# Patient Record
Sex: FEMALE | Race: WHITE | NOT HISPANIC OR LATINO | Employment: OTHER | URBAN - METROPOLITAN AREA
[De-identification: names, ages, dates, MRNs, and addresses within clinical notes are randomized per-mention and may not be internally consistent; named-entity substitution may affect disease eponyms.]

---

## 2017-06-06 ENCOUNTER — HOSPITAL ENCOUNTER (EMERGENCY)
Facility: HOSPITAL | Age: 47
Discharge: HOME | End: 2017-06-07
Attending: EMERGENCY MEDICINE | Admitting: EMERGENCY MEDICINE

## 2017-06-06 DIAGNOSIS — IMO0001 WOUND INFECTION AFTER SURGERY, INITIAL ENCOUNTER: Primary | ICD-10-CM

## 2017-06-06 LAB
ALBUMIN SERPL-MCNC: 4.5 G/DL (ref 3.2–4.8)
ALBUMIN/GLOB SERPL: 1.5 G/DL (ref 1.5–2.5)
ALP SERPL-CCNC: 129 U/L (ref 25–100)
ALT SERPL W P-5'-P-CCNC: 22 U/L (ref 7–40)
ANION GAP SERPL CALCULATED.3IONS-SCNC: 0 MMOL/L (ref 3–11)
AST SERPL-CCNC: 24 U/L (ref 0–33)
BASOPHILS # BLD AUTO: 0.06 10*3/MM3 (ref 0–0.2)
BASOPHILS NFR BLD AUTO: 0.4 % (ref 0–1)
BILIRUB SERPL-MCNC: 0.2 MG/DL (ref 0.3–1.2)
BUN BLD-MCNC: 16 MG/DL (ref 9–23)
BUN/CREAT SERPL: 20 (ref 7–25)
CALCIUM SPEC-SCNC: 9.7 MG/DL (ref 8.7–10.4)
CHLORIDE SERPL-SCNC: 106 MMOL/L (ref 99–109)
CO2 SERPL-SCNC: 33 MMOL/L (ref 20–31)
CREAT BLD-MCNC: 0.8 MG/DL (ref 0.6–1.3)
DEPRECATED RDW RBC AUTO: 53.7 FL (ref 37–54)
EOSINOPHIL # BLD AUTO: 0.26 10*3/MM3 (ref 0.1–0.3)
EOSINOPHIL NFR BLD AUTO: 1.6 % (ref 0–3)
ERYTHROCYTE [DISTWIDTH] IN BLOOD BY AUTOMATED COUNT: 15.1 % (ref 11.3–14.5)
GFR SERPL CREATININE-BSD FRML MDRD: 77 ML/MIN/1.73
GLOBULIN UR ELPH-MCNC: 3 GM/DL
GLUCOSE BLD-MCNC: 82 MG/DL (ref 70–100)
HCT VFR BLD AUTO: 47.9 % (ref 34.5–44)
HGB BLD-MCNC: 15.1 G/DL (ref 11.5–15.5)
IMM GRANULOCYTES # BLD: 0.05 10*3/MM3 (ref 0–0.03)
IMM GRANULOCYTES NFR BLD: 0.3 % (ref 0–0.6)
LYMPHOCYTES # BLD AUTO: 2.82 10*3/MM3 (ref 0.6–4.8)
LYMPHOCYTES NFR BLD AUTO: 17.1 % (ref 24–44)
MCH RBC QN AUTO: 30.4 PG (ref 27–31)
MCHC RBC AUTO-ENTMCNC: 31.5 G/DL (ref 32–36)
MCV RBC AUTO: 96.4 FL (ref 80–99)
MONOCYTES # BLD AUTO: 1.02 10*3/MM3 (ref 0–1)
MONOCYTES NFR BLD AUTO: 6.2 % (ref 0–12)
NEUTROPHILS # BLD AUTO: 12.26 10*3/MM3 (ref 1.5–8.3)
NEUTROPHILS NFR BLD AUTO: 74.4 % (ref 41–71)
PLATELET # BLD AUTO: 187 10*3/MM3 (ref 150–450)
PMV BLD AUTO: 10.7 FL (ref 6–12)
POTASSIUM BLD-SCNC: 4.1 MMOL/L (ref 3.5–5.5)
PROT SERPL-MCNC: 7.5 G/DL (ref 5.7–8.2)
RBC # BLD AUTO: 4.97 10*6/MM3 (ref 3.89–5.14)
SODIUM BLD-SCNC: 139 MMOL/L (ref 132–146)
WBC NRBC COR # BLD: 16.47 10*3/MM3 (ref 3.5–10.8)

## 2017-06-06 PROCEDURE — 80053 COMPREHEN METABOLIC PANEL: CPT | Performed by: EMERGENCY MEDICINE

## 2017-06-06 PROCEDURE — 87205 SMEAR GRAM STAIN: CPT | Performed by: EMERGENCY MEDICINE

## 2017-06-06 PROCEDURE — 96367 TX/PROPH/DG ADDL SEQ IV INF: CPT

## 2017-06-06 PROCEDURE — 99284 EMERGENCY DEPT VISIT MOD MDM: CPT

## 2017-06-06 PROCEDURE — 87186 SC STD MICRODIL/AGAR DIL: CPT | Performed by: EMERGENCY MEDICINE

## 2017-06-06 PROCEDURE — 25010000002 VANCOMYCIN HCL IN NACL 2-0.9 GM/500ML-% SOLUTION: Performed by: EMERGENCY MEDICINE

## 2017-06-06 PROCEDURE — 96366 THER/PROPH/DIAG IV INF ADDON: CPT

## 2017-06-06 PROCEDURE — 87070 CULTURE OTHR SPECIMN AEROBIC: CPT | Performed by: EMERGENCY MEDICINE

## 2017-06-06 PROCEDURE — 87077 CULTURE AEROBIC IDENTIFY: CPT | Performed by: EMERGENCY MEDICINE

## 2017-06-06 PROCEDURE — 96375 TX/PRO/DX INJ NEW DRUG ADDON: CPT

## 2017-06-06 PROCEDURE — 87147 CULTURE TYPE IMMUNOLOGIC: CPT | Performed by: EMERGENCY MEDICINE

## 2017-06-06 PROCEDURE — 96365 THER/PROPH/DIAG IV INF INIT: CPT

## 2017-06-06 PROCEDURE — 85025 COMPLETE CBC W/AUTO DIFF WBC: CPT | Performed by: EMERGENCY MEDICINE

## 2017-06-06 RX ORDER — HYDROCODONE BITARTRATE AND ACETAMINOPHEN 7.5; 325 MG/1; MG/1
1 TABLET ORAL ONCE
Status: COMPLETED | OUTPATIENT
Start: 2017-06-06 | End: 2017-06-06

## 2017-06-06 RX ORDER — ATORVASTATIN CALCIUM 40 MG/1
40 TABLET, FILM COATED ORAL DAILY
COMMUNITY

## 2017-06-06 RX ORDER — ACETAMINOPHEN 500 MG
1000 TABLET ORAL ONCE
Status: COMPLETED | OUTPATIENT
Start: 2017-06-06 | End: 2017-06-06

## 2017-06-06 RX ORDER — PANTOPRAZOLE SODIUM 40 MG/1
40 TABLET, DELAYED RELEASE ORAL DAILY
COMMUNITY

## 2017-06-06 RX ORDER — ALBUTEROL SULFATE 90 UG/1
2 AEROSOL, METERED RESPIRATORY (INHALATION) EVERY 4 HOURS PRN
COMMUNITY

## 2017-06-06 RX ORDER — HYDROCODONE BITARTRATE AND ACETAMINOPHEN 5; 325 MG/1; MG/1
1 TABLET ORAL ONCE
Status: COMPLETED | OUTPATIENT
Start: 2017-06-06 | End: 2017-06-06

## 2017-06-06 RX ORDER — LISINOPRIL 10 MG/1
10 TABLET ORAL DAILY
COMMUNITY

## 2017-06-06 RX ORDER — CARVEDILOL 6.25 MG/1
6.25 TABLET ORAL 2 TIMES DAILY WITH MEALS
COMMUNITY

## 2017-06-06 RX ORDER — VANCOMYCIN 2 GRAM/500 ML IN 0.9 % SODIUM CHLORIDE INTRAVENOUS
2000 ONCE
Status: COMPLETED | OUTPATIENT
Start: 2017-06-06 | End: 2017-06-07

## 2017-06-06 RX ORDER — CEPHALEXIN 250 MG/1
500 CAPSULE ORAL ONCE
Status: COMPLETED | OUTPATIENT
Start: 2017-06-06 | End: 2017-06-06

## 2017-06-06 RX ORDER — ASPIRIN 81 MG/1
81 TABLET ORAL DAILY
COMMUNITY

## 2017-06-06 RX ORDER — SULFAMETHOXAZOLE AND TRIMETHOPRIM 800; 160 MG/1; MG/1
1 TABLET ORAL ONCE
Status: COMPLETED | OUTPATIENT
Start: 2017-06-06 | End: 2017-06-06

## 2017-06-06 RX ADMIN — HYDROCODONE BITARTRATE AND ACETAMINOPHEN 1 TABLET: 5; 325 TABLET ORAL at 19:49

## 2017-06-06 RX ADMIN — SULFAMETHOXAZOLE AND TRIMETHOPRIM 160 MG: 800; 160 TABLET ORAL at 19:49

## 2017-06-06 RX ADMIN — CEPHALEXIN 500 MG: 250 CAPSULE ORAL at 19:49

## 2017-06-06 RX ADMIN — ACETAMINOPHEN 1000 MG: 500 TABLET ORAL at 20:45

## 2017-06-06 RX ADMIN — HYDROCODONE BITARTRATE AND ACETAMINOPHEN 1 TABLET: 7.5; 325 TABLET ORAL at 17:39

## 2017-06-06 RX ADMIN — Medication 2000 MG: at 22:44

## 2017-06-06 NOTE — ED PROVIDER NOTES
Subjective   HPI Comments: Tonia Craven is a 47 y.o.female who presents to the ED with c/o post-op problem. The patient reports she had lypomas removed on her upper back and left breast 2 weeks ago, and has experienced gradually worsening swelling and pain at the site on her back since the surgery. She was seen at [facility] ED earlier today, who suggested she may have a hematoma or small cysts, and suggested follow up with her surgeon. She has experienced continued pain and erythema at the surgical site on her back, and her surgeon is currently out of town, prompting presentation into the ED for further evaluation. The patient denies fever, abdominal pain, or any other acute complaints at this time.      Patient is a 47 y.o. female presenting with wound check.   History provided by:  Patient  Wound Check   Location:  Pt had lypoma removed on her upper back 2 weeks ago  Severity:  Moderate  Onset quality:  Sudden  Duration:  2 weeks  Timing:  Constant  Progression:  Worsening  Chronicity:  New  Context:  Pt has experienced worsening pain, edema and erythema around site since surgery  Relieved by:  Nothing  Worsened by:  Nothing  Ineffective treatments:  Ibuprofen  Associated symptoms: no abdominal pain and no fever        Review of Systems   Constitutional: Negative for fever.   Gastrointestinal: Negative for abdominal pain.   Skin: Positive for wound (surgical incision from lipoma removal 2 weeks ago).   All other systems reviewed and are negative.      Past Medical History:   Diagnosis Date   • Anxiety    • Coronary artery disease    • Hyperlipidemia    • Hypertension    • Stented coronary artery        Allergies   Allergen Reactions   • Ciprofloxacin    • Geodon [Ziprasidone Hcl]    • Levaquin [Levofloxacin]    • Paxil [Paroxetine Hcl]    • Penicillins        Past Surgical History:   Procedure Laterality Date   • ADENOIDECTOMY     • APPENDECTOMY     • CHOLECYSTECTOMY     • HYSTERECTOMY     • KNEE SURGERY  Right    • TONSILLECTOMY         History reviewed. No pertinent family history.    Social History     Social History   • Marital status: Single     Spouse name: N/A   • Number of children: N/A   • Years of education: N/A     Social History Main Topics   • Smoking status: Current Every Day Smoker     Packs/day: 1.00     Years: 25.00     Types: Cigarettes   • Smokeless tobacco: None   • Alcohol use No   • Drug use: Yes     Special: Marijuana   • Sexual activity: Not Asked     Other Topics Concern   • None     Social History Narrative   • None         Objective   Physical Exam   Constitutional: She is oriented to person, place, and time. She appears well-developed and well-nourished. No distress.   HENT:   Head: Normocephalic and atraumatic.   Eyes: Conjunctivae are normal. No scleral icterus.   Neck: Normal range of motion. Neck supple.   Cardiovascular: Normal rate, regular rhythm and normal heart sounds.    Pulmonary/Chest: Effort normal and breath sounds normal. No respiratory distress.   Abdominal: Soft. Bowel sounds are normal. There is no tenderness.   Musculoskeletal: Normal range of motion.   Neurological: She is alert and oriented to person, place, and time.   Skin: Skin is warm and dry. There is erythema.   Left breast incision is clean, dry, intact, and appears to be healing well.   Incision over left upper back is intact, with mild erythema at wound margins, possibly consistent with expeceted healing. Moderate to significant swelling around incision, measuring 4-5 in in diameter. Immediately over incision, the skin is warm and tender. But, over the area of swelling no warmth is appreciated, although it is tender to palpation. No fluctuance. No obvious drainable abscess is appreciated.    Psychiatric: She has a normal mood and affect. Her behavior is normal.   Vitals reviewed.      Incision & Drainage  Date/Time: 6/6/2017 11:20 PM  Performed by: TORRI JAIME  Authorized by: TORRI JAIME     Consent:      Consent obtained:  Verbal, emergent situation and written    Consent given by:  Patient    Risks discussed:  Bleeding, pain, damage to other organs, infection and incomplete drainage    Alternatives discussed:  No treatment, observation and delayed treatment  Location:     Type:  Abscess and fluid collection    Size:  5 inches diameter    Location:  Trunk (Upper back)    Trunk location:  Back  Pre-procedure details:     Skin preparation:  Betadine  Anesthesia (see MAR for exact dosages):     Anesthesia method:  Local infiltration    Local anesthetic:  Lidocaine 1% w/o epi  Procedure details:     Complexity:  Simple    Needle aspiration: no      Incision types:  Stab incision    Scalpel blade:  11    Wound management:  Irrigated with saline    Drainage:  Serosanguinous    Drainage amount:  Copious    Wound treatment:  Wound left open    Packing materials:  None  Post-procedure details:     Patient tolerance of procedure:  Tolerated well, no immediate complications  Comments:      Significant amount of serosanguineous fluid was expressed from the wound. No purulent discharge was appreciated. Patient tolerated the procedure well.                            ED Course  ED Course   Comment By Time   Dr. White paged Dr. Cerda, patients general surgeon. Arlin Burr [facility] 06/06 1837   Spoke with Dr. Oviedo, on-call for Dr. Cerda, who agrees that Keflex medication is appropriate at this time, with follow-up in their office tomorrow. No further intervention is needed here at this time. -CP Arlin [facility] 06/06 1919   Dr. White informed by nurse that patient has developed a fever of 101.9. Paged Dr. Oviedo again. Arlin Aminah [facility] 06/06 2056   Spoke with Dr. Oviedo, who stated wound needed to be drained, and recommended accessing it through current incision, and follow up with Dr. Cerda in the morning. [facility] 06/06 2103     Recent Results (from the past 24 hour(s))   CBC Auto Differential     Collection Time: 06/06/17  5:48 PM   Result Value Ref Range    WBC 16.47 (H) 3.50 - 10.80 10*3/mm3    RBC 4.97 3.89 - 5.14 10*6/mm3    Hemoglobin 15.1 11.5 - 15.5 g/dL    Hematocrit 47.9 (H) 34.5 - 44.0 %    MCV 96.4 80.0 - 99.0 fL    MCH 30.4 27.0 - 31.0 pg    MCHC 31.5 (L) 32.0 - 36.0 g/dL    RDW 15.1 (H) 11.3 - 14.5 %    RDW-SD 53.7 37.0 - 54.0 fl    MPV 10.7 6.0 - 12.0 fL    Platelets 187 150 - 450 10*3/mm3    Neutrophil % 74.4 (H) 41.0 - 71.0 %    Lymphocyte % 17.1 (L) 24.0 - 44.0 %    Monocyte % 6.2 0.0 - 12.0 %    Eosinophil % 1.6 0.0 - 3.0 %    Basophil % 0.4 0.0 - 1.0 %    Immature Grans % 0.3 0.0 - 0.6 %    Neutrophils, Absolute 12.26 (H) 1.50 - 8.30 10*3/mm3    Lymphocytes, Absolute 2.82 0.60 - 4.80 10*3/mm3    Monocytes, Absolute 1.02 (H) 0.00 - 1.00 10*3/mm3    Eosinophils, Absolute 0.26 0.10 - 0.30 10*3/mm3    Basophils, Absolute 0.06 0.00 - 0.20 10*3/mm3    Immature Grans, Absolute 0.05 (H) 0.00 - 0.03 10*3/mm3   Comprehensive Metabolic Panel    Collection Time: 06/06/17  5:48 PM   Result Value Ref Range    Glucose 82 70 - 100 mg/dL    BUN 16 9 - 23 mg/dL    Creatinine 0.80 0.60 - 1.30 mg/dL    Sodium 139 132 - 146 mmol/L    Potassium 4.1 3.5 - 5.5 mmol/L    Chloride 106 99 - 109 mmol/L    CO2 33.0 (H) 20.0 - 31.0 mmol/L    Calcium 9.7 8.7 - 10.4 mg/dL    Total Protein 7.5 5.7 - 8.2 g/dL    Albumin 4.50 3.20 - 4.80 g/dL    ALT (SGPT) 22 7 - 40 U/L    AST (SGOT) 24 0 - 33 U/L    Alkaline Phosphatase 129 (H) 25 - 100 U/L    Total Bilirubin 0.2 (L) 0.3 - 1.2 mg/dL    eGFR Non African Amer 77 >60 mL/min/1.73    Globulin 3.0 gm/dL    A/G Ratio 1.5 1.5 - 2.5 g/dL    BUN/Creatinine Ratio 20.0 7.0 - 25.0    Anion Gap 0.0 (L) 3.0 - 11.0 mmol/L     Note: In addition to lab results from this visit, the labs listed above may include labs taken at another facility or during a different encounter within the last 24 hours. Please correlate lab times with ED admission and discharge times for further clarification of  the services performed during this visit.    No orders to display     Vitals:    06/06/17 2238 06/07/17 0009 06/07/17 0100 06/07/17 0200   BP: 113/55 116/70 105/68 113/69   BP Location: Right arm      Patient Position: Lying      Pulse: 82 80 86 77   Resp: 18   18   Temp: 99 °F (37.2 °C)      TempSrc: Oral      SpO2:  96% 92% 93%   Weight:       Height:         Medications   HYDROcodone-acetaminophen (NORCO) 7.5-325 MG per tablet 1 tablet (1 tablet Oral Given 6/6/17 1739)   cephalexin (KEFLEX) capsule 500 mg (500 mg Oral Given 6/6/17 1949)   sulfamethoxazole-trimethoprim (BACTRIM DS,SEPTRA DS) 800-160 MG per tablet 160 mg (160 mg Oral Given 6/6/17 1949)   HYDROcodone-acetaminophen (NORCO) 5-325 MG per tablet 1 tablet (1 tablet Oral Given 6/6/17 1949)   acetaminophen (TYLENOL) tablet 1,000 mg (1,000 mg Oral Given 6/6/17 2045)   vancomycin IVPB 2000 mg in 0.9% Sodium Chloride (premix) 500 mL (0 mg Intravenous Stopped 6/7/17 0133)   cefTRIAXone (ROCEPHIN) IVPB 1 g (0 g Intravenous Stopped 6/7/17 0206)   Morphine sulfate (PF) injection 2 mg (2 mg Intravenous Given 6/7/17 0106)     ECG/EMG Results (last 24 hours)     ** No results found for the last 24 hours. **              Todays procedure was a temporizing measure and pt will follow up with her surgeon tomorrow morning for definitive management.    MDM  Number of Diagnoses or Management Options  Diagnosis management comments: Concern for seroma vs hematoma vs infection       Final diagnoses:   Wound infection after surgery, initial encounter       Documentation assistance provided by shital Jones.  Information recorded by the shital was done at my direction and has been verified and validated by me.     Arlin Jones  06/06/17 3062       Arlin Jones  06/06/17 6736       Clinton White DO  06/07/17 0217

## 2017-06-07 VITALS
HEIGHT: 62 IN | RESPIRATION RATE: 18 BRPM | BODY MASS INDEX: 47.48 KG/M2 | DIASTOLIC BLOOD PRESSURE: 69 MMHG | HEART RATE: 82 BPM | WEIGHT: 258 LBS | TEMPERATURE: 99 F | SYSTOLIC BLOOD PRESSURE: 113 MMHG | OXYGEN SATURATION: 94 %

## 2017-06-07 PROCEDURE — 25010000002 MORPHINE SULFATE (PF) 2 MG/ML SOLUTION

## 2017-06-07 PROCEDURE — 96375 TX/PRO/DX INJ NEW DRUG ADDON: CPT

## 2017-06-07 PROCEDURE — 96367 TX/PROPH/DG ADDL SEQ IV INF: CPT

## 2017-06-07 PROCEDURE — 25010000003 CEFTRIAXONE PER 250 MG: Performed by: EMERGENCY MEDICINE

## 2017-06-07 RX ORDER — SULFAMETHOXAZOLE AND TRIMETHOPRIM 800; 160 MG/1; MG/1
1 TABLET ORAL 2 TIMES DAILY
Qty: 20 TABLET | Refills: 0 | Status: SHIPPED | OUTPATIENT
Start: 2017-06-07 | End: 2017-06-17

## 2017-06-07 RX ORDER — MORPHINE SULFATE 2 MG/ML
INJECTION, SOLUTION INTRAMUSCULAR; INTRAVENOUS
Status: COMPLETED
Start: 2017-06-07 | End: 2017-06-07

## 2017-06-07 RX ORDER — MORPHINE SULFATE 2 MG/ML
2 INJECTION, SOLUTION INTRAMUSCULAR; INTRAVENOUS ONCE
Status: COMPLETED | OUTPATIENT
Start: 2017-06-07 | End: 2017-06-07

## 2017-06-07 RX ORDER — CEFTRIAXONE SODIUM 1 G/50ML
1 INJECTION, SOLUTION INTRAVENOUS ONCE
Status: COMPLETED | OUTPATIENT
Start: 2017-06-07 | End: 2017-06-07

## 2017-06-07 RX ORDER — CEPHALEXIN 500 MG/1
500 CAPSULE ORAL 4 TIMES DAILY
Qty: 40 CAPSULE | Refills: 0 | Status: SHIPPED | OUTPATIENT
Start: 2017-06-07 | End: 2017-06-17

## 2017-06-07 RX ADMIN — CEFTRIAXONE SODIUM 1 G: 1 INJECTION, SOLUTION INTRAVENOUS at 01:33

## 2017-06-07 RX ADMIN — MORPHINE SULFATE 2 MG: 2 INJECTION, SOLUTION INTRAMUSCULAR; INTRAVENOUS at 01:06

## 2017-06-09 LAB
BACTERIA SPEC AEROBE CULT: ABNORMAL
GRAM STN SPEC: ABNORMAL